# Patient Record
Sex: MALE | Race: WHITE | Employment: OTHER | URBAN - METROPOLITAN AREA
[De-identification: names, ages, dates, MRNs, and addresses within clinical notes are randomized per-mention and may not be internally consistent; named-entity substitution may affect disease eponyms.]

---

## 2024-01-29 ENCOUNTER — HOSPITAL ENCOUNTER (OUTPATIENT)
Facility: HOSPITAL | Age: 64
Setting detail: OUTPATIENT SURGERY
Discharge: HOME | End: 2024-01-29
Attending: INTERNAL MEDICINE | Admitting: INTERNAL MEDICINE
Payer: COMMERCIAL

## 2024-01-29 VITALS
SYSTOLIC BLOOD PRESSURE: 148 MMHG | OXYGEN SATURATION: 99 % | HEIGHT: 72 IN | BODY MASS INDEX: 26.36 KG/M2 | TEMPERATURE: 97.8 F | RESPIRATION RATE: 15 BRPM | HEART RATE: 81 BPM | DIASTOLIC BLOOD PRESSURE: 89 MMHG | WEIGHT: 194.6 LBS

## 2024-01-29 LAB — GLUCOSE BLD STRIP.AUTO-MCNC: 150 MG/DL (ref 70–110)

## 2024-01-29 PROCEDURE — 99153 MOD SED SAME PHYS/QHP EA: CPT | Performed by: INTERNAL MEDICINE

## 2024-01-29 PROCEDURE — 82962 GLUCOSE BLOOD TEST: CPT

## 2024-01-29 PROCEDURE — 7100000011 HC PHASE II RECOVERY - ADDTL 15 MIN: Performed by: INTERNAL MEDICINE

## 2024-01-29 PROCEDURE — 99152 MOD SED SAME PHYS/QHP 5/>YRS: CPT | Performed by: INTERNAL MEDICINE

## 2024-01-29 PROCEDURE — 2709999900 HC NON-CHARGEABLE SUPPLY: Performed by: INTERNAL MEDICINE

## 2024-01-29 PROCEDURE — 2580000003 HC RX 258: Performed by: INTERNAL MEDICINE

## 2024-01-29 PROCEDURE — 7100000010 HC PHASE II RECOVERY - FIRST 15 MIN: Performed by: INTERNAL MEDICINE

## 2024-01-29 PROCEDURE — 6360000002 HC RX W HCPCS: Performed by: INTERNAL MEDICINE

## 2024-01-29 PROCEDURE — 3600007502: Performed by: INTERNAL MEDICINE

## 2024-01-29 PROCEDURE — 3600007512: Performed by: INTERNAL MEDICINE

## 2024-01-29 RX ORDER — MIDAZOLAM HYDROCHLORIDE 1 MG/ML
5 INJECTION, SOLUTION INTRAMUSCULAR; INTRAVENOUS
Status: DISCONTINUED | OUTPATIENT
Start: 2024-01-29 | End: 2024-01-29 | Stop reason: HOSPADM

## 2024-01-29 RX ORDER — AMLODIPINE BESYLATE 10 MG/1
10 TABLET ORAL DAILY
COMMUNITY

## 2024-01-29 RX ORDER — MIDAZOLAM HYDROCHLORIDE 1 MG/ML
INJECTION INTRAMUSCULAR; INTRAVENOUS PRN
Status: DISCONTINUED | OUTPATIENT
Start: 2024-01-29 | End: 2024-01-29 | Stop reason: ALTCHOICE

## 2024-01-29 RX ORDER — GLYCOPYRROLATE 0.2 MG/ML
0.1 INJECTION INTRAMUSCULAR; INTRAVENOUS ONCE
Status: DISCONTINUED | OUTPATIENT
Start: 2024-01-29 | End: 2024-01-29 | Stop reason: HOSPADM

## 2024-01-29 RX ORDER — HYDRALAZINE HYDROCHLORIDE 20 MG/ML
INJECTION INTRAMUSCULAR; INTRAVENOUS PRN
Status: DISCONTINUED | OUTPATIENT
Start: 2024-01-29 | End: 2024-01-29 | Stop reason: ALTCHOICE

## 2024-01-29 RX ORDER — FLUOXETINE HYDROCHLORIDE 40 MG/1
40 CAPSULE ORAL DAILY
COMMUNITY

## 2024-01-29 RX ORDER — DIPHENHYDRAMINE HYDROCHLORIDE 50 MG/ML
25 INJECTION INTRAMUSCULAR; INTRAVENOUS EVERY 6 HOURS PRN
Status: DISCONTINUED | OUTPATIENT
Start: 2024-01-29 | End: 2024-01-29 | Stop reason: HOSPADM

## 2024-01-29 RX ORDER — LOSARTAN POTASSIUM AND HYDROCHLOROTHIAZIDE 25; 100 MG/1; MG/1
1 TABLET ORAL DAILY
COMMUNITY

## 2024-01-29 RX ORDER — FLUMAZENIL 0.1 MG/ML
0.2 INJECTION INTRAVENOUS ONCE
Status: DISCONTINUED | OUTPATIENT
Start: 2024-01-29 | End: 2024-01-29 | Stop reason: HOSPADM

## 2024-01-29 RX ORDER — SODIUM CHLORIDE 9 MG/ML
INJECTION, SOLUTION INTRAVENOUS CONTINUOUS
Status: DISCONTINUED | OUTPATIENT
Start: 2024-01-29 | End: 2024-01-29 | Stop reason: HOSPADM

## 2024-01-29 RX ORDER — SEMAGLUTIDE 1.34 MG/ML
1 INJECTION, SOLUTION SUBCUTANEOUS WEEKLY
COMMUNITY

## 2024-01-29 RX ORDER — SODIUM CHLORIDE 9 MG/ML
INJECTION, SOLUTION INTRAVENOUS CONTINUOUS
Status: CANCELLED | OUTPATIENT
Start: 2024-01-29

## 2024-01-29 RX ORDER — NALOXONE HYDROCHLORIDE 0.4 MG/ML
0.4 INJECTION, SOLUTION INTRAMUSCULAR; INTRAVENOUS; SUBCUTANEOUS PRN
Status: DISCONTINUED | OUTPATIENT
Start: 2024-01-29 | End: 2024-01-29 | Stop reason: HOSPADM

## 2024-01-29 RX ORDER — FENTANYL CITRATE 50 UG/ML
INJECTION, SOLUTION INTRAMUSCULAR; INTRAVENOUS PRN
Status: DISCONTINUED | OUTPATIENT
Start: 2024-01-29 | End: 2024-01-29 | Stop reason: ALTCHOICE

## 2024-01-29 RX ORDER — CLONIDINE HYDROCHLORIDE 0.2 MG/1
0.2 TABLET ORAL 2 TIMES DAILY
Status: ON HOLD | COMMUNITY
End: 2024-01-29

## 2024-01-29 RX ORDER — SIMETHICONE 20 MG/.3ML
40 EMULSION ORAL EVERY 6 HOURS PRN
Status: DISCONTINUED | OUTPATIENT
Start: 2024-01-29 | End: 2024-01-29 | Stop reason: HOSPADM

## 2024-01-29 RX ORDER — FENTANYL CITRATE 50 UG/ML
100 INJECTION, SOLUTION INTRAMUSCULAR; INTRAVENOUS
Status: DISCONTINUED | OUTPATIENT
Start: 2024-01-29 | End: 2024-01-29 | Stop reason: HOSPADM

## 2024-01-29 RX ORDER — EPINEPHRINE IN SOD CHLOR,ISO 1 MG/10 ML
1 SYRINGE (ML) INTRAVENOUS ONCE
Status: DISCONTINUED | OUTPATIENT
Start: 2024-01-29 | End: 2024-01-29 | Stop reason: HOSPADM

## 2024-01-29 RX ORDER — BUPROPION HYDROCHLORIDE 150 MG/1
150 TABLET ORAL EVERY MORNING
COMMUNITY

## 2024-01-29 RX ADMIN — SODIUM CHLORIDE: 9 INJECTION, SOLUTION INTRAVENOUS at 13:15

## 2024-01-29 ASSESSMENT — PAIN - FUNCTIONAL ASSESSMENT
PAIN_FUNCTIONAL_ASSESSMENT: ADULT NONVERBAL PAIN SCALE (NPVS)
PAIN_FUNCTIONAL_ASSESSMENT: NONE - DENIES PAIN
PAIN_FUNCTIONAL_ASSESSMENT: 0-10
PAIN_FUNCTIONAL_ASSESSMENT: NONE - DENIES PAIN
PAIN_FUNCTIONAL_ASSESSMENT: 0-10
PAIN_FUNCTIONAL_ASSESSMENT: NONE - DENIES PAIN
PAIN_FUNCTIONAL_ASSESSMENT: ADULT NONVERBAL PAIN SCALE (NPVS)
PAIN_FUNCTIONAL_ASSESSMENT: 0-10
PAIN_FUNCTIONAL_ASSESSMENT: ADULT NONVERBAL PAIN SCALE (NPVS)
PAIN_FUNCTIONAL_ASSESSMENT: ADULT NONVERBAL PAIN SCALE (NPVS)
PAIN_FUNCTIONAL_ASSESSMENT: 0-10

## 2024-01-29 NOTE — H&P
Assessment/Plan  # Detail Type Description    1. Assessment Hemorrhoids (K64.9).    Impression Onset of External Hemorrhoids, triggered by straining defecatory effort, with difficulty passing stool. Pt reports only one latent episode of bleeding, with toilet bowl filling, that occurred prior to his last c-scope exam in 2014.  No recurrence of rectal bleeding since.    Pt reports presence of \"vikas-sized\" external hemorrhoid, asymptomatic, and non-bleeding. Pt wishes to leave this hemorrhoid alone, and avoid removal if possible.   I explained to the patient, that the hemorrhoid (if truly a hemorrhoid) would not be bothered, unless this was actually a suspicious mass instead of a hemorrhoid.    Patient Plan *Hemorrhoid/Anal Fissure Instructions provided to pt:   Avoid wiping with dry toilet paper, use either moistened toiler paper, pre-moistened wipes, or warm water to cleanse area after bowel movements. Apply OTC Tucks pads, or preparation-H cream as needed to area for itching, and discomfort. Use sitz baths twice daily if needed to soothe the area to allow inflammation to decrease and to promote healing. Use stool softeners twice daily and avoid straining with bowel movements, and sitting for long periods of time.    Provider Plan          2. Assessment Other fecal abnormalities (R19.5).    Impression Recently developed anal leakage of clear fluid (?Weak anal sphincter).    Patient Plan -Instructions provided to patient for Strengthening Anal Sphincter Tone-  *Practicing Kegel Exercises:   1. Relax your abdomen and buttocks, as you don't want to exercise those muscle groups.  2. Spread your legs spread slightly apart.  3. Consciously squeeze your anus and pelvic floor muscles-as if you were trying to stop urinating midstream (Do not actually do this while on the toilet! Sit on a firm/supportive chair or stool to do these exercises).  4. Hold for 5 to 10 seconds.  5. Release gently.  6. Repeat 5 times.  -Instructed

## 2024-01-29 NOTE — PRE SEDATION
Sedation Pre-Procedure Note    Patient Name: Adrianne Esparza   YOB: 1960  Room/Bed: ENDO/PL  Medical Record Number: [id number]  Date: 1/29/2024   Time: 3:23 PM       Indication:  history of colon  polyp      Consent: I have discussed with the patient and/or the patient representative the indication, alternatives, and the possible risks and/or complications of the planned procedure and the anesthesia methods. The patient and/or patient representative appear to understand and agree to proceed.    Vital Signs:   Vitals:    01/29/24 1250   BP: (!) 171/104   Pulse: 72   Resp: 20   Temp: 99 °F (37.2 °C)   SpO2: 100%       Past Medical History:   has a past medical history of Diabetes mellitus (HCC), Hypertension, and Skin cancer.    Past Surgical History:   has a past surgical history that includes Mohs surgery and Tonsillectomy and Adenoidectomy.    Medications:   Scheduled Meds:    flumazenil  0.2 mg IntraVENous Once    benzocaine   Mouth/Throat 4x Daily    EPINEPHrine  1 mg IntraVENous Once    glycopyrrolate  0.1 mg IntraVENous Once     Continuous Infusions:    sodium chloride 100 mL/hr at 01/29/24 1315    fentaNYL      midazolam       PRN Meds: naloxone, simethicone, diphenhydrAMINE, hydrALAZINE  Home Meds:   Prior to Admission medications    Medication Sig Start Date End Date Taking? Authorizing Provider   amLODIPine (NORVASC) 10 MG tablet Take 1 tablet by mouth daily   Yes Magno Vigil MD   buPROPion (WELLBUTRIN XL) 150 MG extended release tablet Take 1 tablet by mouth every morning   Yes ProviderMagno MD   FLUoxetine (PROZAC) 40 MG capsule Take 1 capsule by mouth daily   Yes Magno Vigil MD   losartan-hydroCHLOROthiazide (HYZAAR) 100-25 MG per tablet Take 1 tablet by mouth daily   Yes Magno Vigil MD   Semaglutide, 1 MG/DOSE, (OZEMPIC, 1 MG/DOSE,) 4 MG/3ML SOPN Inject 1 mg into the skin once a week   Yes Magno Vigil MD   brexpiprazole (REXULTI) 0.5 MG TABS

## 2024-01-29 NOTE — PERIOP NOTE
FACE TO FACE Discharged  instruction given to family and PT agreed with the plan. Discharged includes diet, activity limitations , medication to continue and 10 YEARS REPEAT COLONOSCOPY . D/c to home in stable condition with care of family.  ADVISED by Doctor CAITIE Freeman to take his b/p meds tomorrow and pt agreed

## 2024-01-29 NOTE — DISCHARGE INSTRUCTIONS
shortness of breath while lying flat in bed.  Please call your doctor as soon as you notice any of these symptoms; do not wait until your next office visit.        The discharge information has been reviewed with the patient and caregiver.  The patient and caregiver verbalized understanding.  Discharge medications reviewed with the patient and caregiver and appropriate educational materials and side effects teaching were provided.  ___________________________________________________________________________________________________________________________________Adrianne CHRISTIE Payson  [id number]  1960    COLON DISCHARGE INSTRUCTIONS    Discomfort:  Redness at IV site- apply warm compress to area; if redness or soreness persist- contact your physician  There may be a slight amount of blood passed from the rectum  Gaseous discomfort- walking, belching will help relieve any discomfort  You may not operate a vehicle til the next day.  You may not engage in an occupation involving machinery or appliances til the next day.  You may not drink alcoholic beverages til the next day.    DIET:   High fiber diet.     ACTIVITY:  You may not  resume your normal daily activities til the next day. it is recommended that you spend the remainder of the day resting -  avoid any strenuous activity.    CALL M.D.  IF ANY SIGN OF:   Increasing pain, nausea, vomiting  Abdominal distension (swelling)  New increased bleeding (oral or rectal)  Fever (chills)  Pain in chest area  Bloody discharge from nose or mouth  Shortness of breath    You may  take any Advil, Aspirin, Ibuprofen, Motrin, Aleve, or Goody’s but preferably Tylenol as needed for pain.    Post procedure diagnosis:  Large grade 3 ulcerated protruding internal hemorrhoids with a medium sized external anal hypertrophied anal papilla. Hypertonic and sensitive anal sphincter. Long and loopy sigmoid with fair bowel prep. Mild sigmoid diverticulosis. No Polyps found.    Follow-up

## 2024-01-29 NOTE — PROCEDURES
[facility]  Colonoscopy Procedure Report  _______________________________________________________  Patient: Adrianne Esparza                                        Attending Physician: JEB Mercer MD    Patient ID: [id number]                                    Referring Physician: Unknown, Provider, DO    Exam Date: 1/29/2024     Introduction: A  64 y.o. male patient, presents for inpatient Colonoscopy    Indications: 65 yo male a t of Marily Nobles PA-C, here overdue for 5yr Recall, for surveillance of colonic adenomas/screening colonoscopy.   \"EGD showed severe gastritis without H. pylori and severe ulcerated esophagitis.\"  Last colonoscopy (1st exam - @53yo) was done on 3/31/2014 by Dr. Chang @[facility]:   Polyps (1 mm to 3 mm) In the 20 cm. ((1x TA, 1x HP)).  Internal hemorrhoids. Mild diverticulosis of the sigmoid colon.  Polyp (4 mm) In the 30 cm. (Ulcer with granulation tissue).  5yr Recall Recommended (Was not kept)-Old records unavailable at this time, any records recovered will be scanned to chart at later date.  No reported FHx of colon cancer. BMI: 27.1, BM: 1-2x daily..     Consent: The benefits, risks, and alternatives to the procedure were discussed and informed consent was obtained from the patient.    Preparation: EKG, pulse, pulse oximetry and blood pressure were monitored throughout the procedure. ASA Classification: Class II- . The heart is an S1-S2 and regular heart rate and rhythm. Lungs are clear to auscultation and percussion. Abdomen is soft, nondistended, and nontender. Mental Status: awake, alert, and oriented to person, place, and time    Medications:  Fentanyl 200 mcg IV and Versed 7 mg IV throughout the procedure.Hydralazin 20 mg for HTN.  Rectal Exam: Large grade 3 ulcerated protruding internal hemorrhoids with a medium sized external anal hypertrophied anal papilla. Hypertonic and sensitive anal sphincter. Long and loopy sigmoid with fair bowel prep. Mild sigmoid

## (undated) DEVICE — CATHETER IV 22GA L1IN BLU POLYUR STR HUB RADPQ PROTCT +

## (undated) DEVICE — KENDALL RADIOLUCENT FOAM MONITORING ELECTRODE RECTANGULAR SHAPE: Brand: KENDALL

## (undated) DEVICE — WRISTBAND ID AD W2.5XL9.5CM RED VYN ADH CLSR UNI-PRINT

## (undated) DEVICE — CATHETER PH SUCT 14FR

## (undated) DEVICE — CANNULA CUSH AD W/ 14FT TBG

## (undated) DEVICE — SYRINGE 50ML E/T

## (undated) DEVICE — SYRINGE MED 5ML STD CLR PLAS LUERLOCK TIP N CTRL DISP

## (undated) DEVICE — Device

## (undated) DEVICE — SOLUTION IV 1000ML 0.9% SOD CHL PH 5 INJ USP VIAFLX PLAS

## (undated) DEVICE — TOURNIQUET PHLEB W1XL18IN BLU FLAT RL AND BND REUSE FOR IV

## (undated) DEVICE — BLUNTFILL: Brand: MONOJECT

## (undated) DEVICE — TUBING, SUCTION, 1/4" X 12', STRAIGHT: Brand: MEDLINE

## (undated) DEVICE — SYRINGE MEDICAL 3ML CLEAR PLASTIC STANDARD NON CONTROL LUERLOCK TIP DISPOSABLE